# Patient Record
(demographics unavailable — no encounter records)

---

## 2024-10-14 NOTE — HISTORY OF PRESENT ILLNESS
[FreeTextEntry1] : 10/25/23 Pt is s/p right knee RFA performed on 9/27/23 and a left knee RFA on 10/4/23 patient experienced 50% relief up until this Monday 10/23/23 due to her suffering a fall. Pt states that she didn't lift her feet high enough to get into her house causing her to fall. Pain now is dull/achy in nature. She will occasionally experience radiating pain down the b/l legs. Pt has been using Motrin and meloxicam 15mg PRN. She noticed that her knee pain is made worse after physical activity and rainy weather. Pt is feeling ok today rating her pain a 5/10 on the pain scale.   PRESENTING TODAY 9/30/24: Patient presents for a follow up visit today; she is most recently status post a Lumbar interlaminar (L4-5) epidural steroid injection with MAC on 09/11/2024, with 90% relief to date, she is able to carry on her daily living with less pain.  Overall she states that her pain is stable and she has no more radiating pain down her lower extremity. Patient denies any bowel or bladder dysfunction, incontinence, or saddle anesthesia.  10-: Revisit encounter.   She is presenting today with a flare up of lower back and radicular pain. She has been experiencing pain in the right side of the lower back which is made worse with walking for prolonged periods of time. She describes the pain as a sharp sensation. Her main pain complaint is her axial pain. She cannot walk as she starts to get the sharp sensations in her back which causes her to sit down. She has no pain when sitting at rest. She does also note right leg pain which gets centered in different area (calf, hamstring, thigh).

## 2024-10-14 NOTE — DISCUSSION/SUMMARY
[de-identified] : A discussion regarding available pain management treatment options occurred with the patient. These included interventional, rehabilitative, pharmacological, and alternative modalities. We will proceed with the following:   Interventional/ Procedures: 1. Proceed with a Right L3, L4, L5 medial branch facet block under fluoroscopic guidance and sedation.  Treatment options were discussed with the patient. The patient has been having persistent axial low back pain that has minimally improved with conservative therapy.    The patient was given the option to proceed with a lumbar medial branch block, which would be potentially both diagnostic and therapeutic. If the patient has a positive response to the local anesthetic, With two positive responses we can proceed with a radiofrequency ablation of the lumbar medial branch nerves for potential long term pain relief. If the patient does not get relief we can consider other options.    The risks and benefits were discussed which included bleeding, infection, nerve injury, no pain relief or worse, increased pain, intrathecal injection, and the side effects of steroids.  All questions were answered to the patient's satisfaction  The patient has severe anxiety of procedures that necessitates monitored anesthesia care (MAC). The procedure performed will be close to major nerves, arteries, and spinal cord and/or joint structures. Due to the proximity of these structures, we need the patient to be still during the procedure.  With the help of MAC, this will be safely achieved and decrease the risk of any complications.   - Follow up 2 weeks post injection.   I, Margot Torres, attest that this documentation has been prepared under the direction and in the presence of Provider Nicolas Brown, DO   The documentation recorded by the scribe, in my presence, accurately reflects the service I personally performed, and the decisions made by me with my edits as appropriate.   Best Regards,  Nicolas Brown D.O.

## 2024-10-30 NOTE — PROCEDURE
[FreeTextEntry3] : Date of Service: 10/30/2024   Account: [id number]  Patient: FRANCIS GARCIA   YOB: 1948  Age: 76 year  Surgeon:                  Nicolas Brown DO  Assistant:                None  Pre-Operative Diagnosis: Spondylosis of Lumbar Region without Myelopathy or Radiculopathy (M47.816)      Post Operative Diagnosis: Spondylosis of Lumbar Region without Myelopathy or Radiculopathy (M47.816)      Procedure:             Right L3-5 medial branch block under fluoroscopic guidance.  Anesthesia:            MAC  This procedure was carried out using fluoroscopic guidance.  The risks and benefits of the procedure were discussed extensively with the patient.  The consent of the patient was obtained and the following procedure was performed. The patient was placed in the prone position on the fluoroscopy table and the area was prepped and draped in a sterile fashion.  A timeout was performed with all essential staff present and the site and side were verified.  The patient was placed in the prone position with a pillow under the abdomen to minimize the lumbar lordosis.  The lumbar area was prepped and draped in a sterile fashion.  The lumbar vertebral bodies were identified and the fluoroscope was obliqued ipsilateral to approximately 30 degrees to reveal the appropriate anatomical view.  The junction of the superior articulate process and transverse process at the right  L4, L5, and sacral ala levels were identified and marked.   A spinal needle was then introduced and advanced to the above target points at the junction of the SAP and transverse processes until bone was contacted.     After negative aspiration for heme and CSF, an injectate of 1 cc 0.25% Marcaine mixed with 10mg dexamethasone was injected at each of the injection sites.  The needles were subsequently removed.  Vital signs remained normal.  Pulse oximeter was used throughout the procedure and the patient's pulse and oxygen saturation remained within normal limits.  The patient tolerated the procedure well.  There were no complications.  The patient was instructed to apply ice over the injection sites for twenty minutes every two hours for the next 24 to 48 hours.  Disposition:       1. The patient was advised to F/U in 1-2 weeks to assess the response to the injection.       2. They were advised to keep a pain diary to report the results of the diagnostic block at their FUV.      3. The patient was also instructed to contact me immediately if there were any concerns related to the procedure performed.   Nicolas Brown DO

## 2024-11-11 NOTE — DISCUSSION/SUMMARY
[de-identified] : A discussion regarding available pain management treatment options occurred with the patient. These included interventional, rehabilitative, pharmacological, and alternative modalities. We will proceed with the following:   Interventional/ Procedures: 1. Proceed with a right L3, L4, L5 medial branch facet block under fluoroscopic guidance and sedation.  Treatment options were discussed with the patient. The patient has been having persistent axial low back pain that has minimally improved with conservative therapy.  The patient was given the option to proceed with a lumbar medial branch block, which would be potentially both diagnostic and therapeutic. If the patient has a positive response to the local anesthetic, with two positive responses we can proceed with a radiofrequency ablation of the lumbar medial branch nerves for potential long term pain relief. If the patient does not get relief, we can consider other options.    The risks and benefits were discussed which included bleeding, infection, nerve injury, no pain relief or worse, increased pain, intrathecal injection, and the side effects of steroids.  All questions were answered to the patient's satisfaction  The patient has severe anxiety of procedures that necessitates monitored anesthesia care (MAC). The procedure performed will be close to major nerves, arteries, and spinal cord and/or joint structures. Due to the proximity of these structures, we need the patient to be still during the procedure.  With the help of MAC, this will be safely achieved and decrease the risk of any complications.   2.  Opted to perform a left knee steroid injection in office today. Risks with the procedure such as bleeding and infection was discussed in detail.  - Follow up 2 weeks post injection.   I, Jose Kruger, attest that this documentation has been prepared under the direction and in the presence of Provider Nicolas Brown, DO The documentation recorded by the scribe, in my presence, accurately reflects the service I personally performed, and the decisions made by me with my edits as appropriate.   Best Regards, Nicolas Brown D.O.

## 2024-11-11 NOTE — PROCEDURE
[Large Joint Injection] : Large joint injection [Left] : of the left [Knee] : knee [Pain] : pain [Alcohol] : alcohol [Sterile technique used] : sterile technique used [___ cc    0.5%] : Bupivacaine (Marcaine) ~Vcc of 0.5%  [___ cc    4mg] : Dexamethasone (Decadron) ~Vcc of 4 mg  [] : Patient tolerated procedure well [Call if redness, pain or fever occur] : call if redness, pain or fever occur [Apply ice for 15min out of every hour for the next 12-24 hours as tolerated] : apply ice for 15 minutes out of every hour for the next 12-24 hours as tolerated [Previous OTC use and PT nontherapeutic] : patient has tried OTC's including aspirin, Ibuprofen, Aleve, etc or prescription NSAIDS, and/or exercises at home and/or physical therapy without satisfactory response [Patient had decreased mobility in the joint] : patient had decreased mobility in the joint [Risks, benefits, alternatives discussed / Verbal consent obtained] : the risks benefits, and alternatives have been discussed, and verbal consent was obtained

## 2024-11-11 NOTE — HISTORY OF PRESENT ILLNESS
[FreeTextEntry1] : 10/25/23 Pt is s/p right knee RFA performed on 9/27/23 and a left knee RFA on 10/4/23 patient experienced 50% relief up until this Monday 10/23/23 due to her suffering a fall. Pt states that she didn't lift her feet high enough to get into her house causing her to fall. Pain now is dull/achy in nature. She will occasionally experience radiating pain down the b/l legs. Pt has been using Motrin and meloxicam 15mg PRN. She noticed that her knee pain is made worse after physical activity and rainy weather. Pt is feeling ok today rating her pain a 5/10 on the pain scale.   PRESENTING TODAY 9/30/24: Patient presents for a follow up visit today; she is most recently status post a Lumbar interlaminar (L4-5) epidural steroid injection with MAC on 09/11/2024, with 90% relief to date, she is able to carry on her daily living with less pain.  Overall she states that her pain is stable and she has no more radiating pain down her lower extremity. Patient denies any bowel or bladder dysfunction, incontinence, or saddle anesthesia.  PRESENTING TODAY 11-: Last seen on 10/14/2024 and since then she is status post a right L3-5 medial branch block under fluoroscopic guidance on 10/30/24 which provided her with 80% relief for 3 to 4 days.  She had less intense and less frequent pain.  However, pain is returned to baseline. She has been experiencing pain in the right side of the lower back which is made worse with walking for prolonged periods of time. She describes the pain as a sharp sensation. Her main pain complaint is her axial pain. She cannot walk as she starts to get the sharp sensations in her back which causes her to sit down. She has no pain when sitting at rest. She does also note right leg pain which gets centered in different area (calf, hamstring, thigh).   Her chief complaint today is her left knee pain that is dull, achy knee pain worse with standing and walking, better with sitting without associated numbness, tingling or radicular pain for (6 weeks). Pain is 8/10 in intensity. She has tried NSAIDs, PT/HEP without significant relief. Patient denies locking, clicking, instability, falls.

## 2024-11-27 NOTE — PROCEDURE
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  Anesthesia:            MAC  This procedure was carried out using fluoroscopic guidance.  The risks and benefits of the procedure were discussed extensively with the patient.  The consent of the patient was obtained and the following procedure was performed. The patient was placed in the prone position on the fluoroscopy table and the area was prepped and draped in a sterile fashion.  A timeout was performed with all essential staff present and the site and side were verified.  The patient was placed in the prone position with a pillow under the abdomen to minimize the lumbar lordosis.  The lumbar area was prepped and draped in a sterile fashion.  The lumbar vertebral bodies were identified and the fluoroscope was obliqued ipsilateral to approximately 30 degrees to reveal the appropriate anatomical view.  The junction of the superior articulate process and transverse process at the right  L4, L5, and sacral ala levels were identified and marked.   A spinal needle was then introduced and advanced to the above target points at the junction of the SAP and transverse processes until bone was contacted.     After negative aspiration for heme and CSF, an injectate of 1 cc 0.25% Marcaine mixed with 10mg dexamethasone was injected at each of the injection sites.  The needles were subsequently removed.  Vital signs remained normal.  Pulse oximeter was used throughout the procedure and the patient's pulse and oxygen saturation remained within normal limits.  The patient tolerated the procedure well.  There were no complications.  The patient was instructed to apply ice over the injection sites for twenty minutes every two hours for the next 24 to 48 hours.  Disposition:       1. The patient was advised to F/U in 1-2 weeks to assess the response to the injection.       2. They were advised to keep a pain diary to report the results of the diagnostic block at their FUV.      3. The patient was also instructed to contact me immediately if there were any concerns related to the procedure performed.   Nicolas Brown DO

## 2024-12-09 NOTE — DISCUSSION/SUMMARY
[de-identified] : A discussion regarding available pain management treatment options occurred with the patient. These included interventional, rehabilitative, pharmacological, and alternative modalities. We will proceed with the following:   Interventional/ Procedures: 1. Proceed with a right L3, L4, L5 medial branch RFA under fluoroscopic guidance and sedation. The patient had a positive response to two lumbar medial branch block blocks so the decision was made to proceed with a lumbar medial branch radiofrequency ablation to try to get some longer term relief. Given that the patient had very good pain relief for the duration of the local anesthetic during the medial branch block, our hopes are that with the radiofrequency procedure we will be able to get 6-9 months of good lumbar spine pain relief.       The risks of the procedure were discussed which included the small but potential risks of bleeding, nerve damage, worsening pain, and the need to repeat the procedure to name a few. The patient accepts these risks and would like to proceed.  The patient has severe anxiety of procedures that necessitates monitored anesthesia care (MAC). The procedure performed will be close to major nerves, arteries, and spinal cord and/or joint structures. Due to the proximity of these structures, we need the patient to be still during the procedure.  With the help of MAC, this will be safely achieved and decrease the risk of any complications.   - Follow up 2 weeks post injection.   I, Jose Kruger, attest that this documentation has been prepared under the direction and in the presence of Provider Nicolas Brown, DO The documentation recorded by the scribe, in my presence, accurately reflects the service I personally performed, and the decisions made by me with my edits as appropriate.   Best Regards, Nicolas Brown D.O.

## 2024-12-09 NOTE — HISTORY OF PRESENT ILLNESS
[FreeTextEntry1] : 10/25/23 Pt is s/p right knee RFA performed on 9/27/23 and a left knee RFA on 10/4/23 patient experienced 50% relief up until this Monday 10/23/23 due to her suffering a fall. Pt states that she didn't lift her feet high enough to get into her house causing her to fall. Pain now is dull/achy in nature. She will occasionally experience radiating pain down the b/l legs. Pt has been using Motrin and meloxicam 15mg PRN. She noticed that her knee pain is made worse after physical activity and rainy weather. Pt is feeling ok today rating her pain a 5/10 on the pain scale.   PRESENTING TODAY 9/30/24: Patient presents for a follow up visit today; she is most recently status post a Lumbar interlaminar (L4-5) epidural steroid injection with MAC on 09/11/2024, with 90% relief to date, she is able to carry on her daily living with less pain. Overall, she states that her pain is stable, and she has no more radiating pain down her lower extremity. Patient denies any bowel or bladder dysfunction, incontinence, or saddle anesthesia.  PRESENTING TODAY 12-: Last seen on 11/11/2024 and since then she is status post a 2nd right L3-5 medial branch block under fluoroscopic guidance on 11/27/24 which provided her with 80% sustained relief to date.  She had less intense and less frequent pain. She has been experiencing pain in the right side of the lower back which is made worse with walking for prolonged periods of time. She describes the pain as a sharp sensation. Her main pain complaint is her axial pain. She cannot walk as she starts to get the sharp sensations in her back which causes her to sit down. She has no pain when sitting at rest. She does also note right leg pain which gets centered in different area (calf, hamstring, thigh).  Pain is a 3/10 on the pain scale.  She is status post a left knee steroid injection since the last visit which provided her with 80% sustained relief to date. She notes less intense and less frequent pain. Pain is 2-3/10 in intensity.  Patient denies locking, clicking, instability, falls.

## 2025-01-29 NOTE — PROCEDURE
[FreeTextEntry3] : Date of Service: 01/29/2025   Account: [id number]  Patient: FRANCIS GARCIA   YOB: 1948  Age: 76 year  Surgeon:                  Nicolas Brown DO  Assistant:                None  Pre-Operative Diagnosis:   Spondylosis of Lumbar Region without Myelopathy or Radiculopathy (M47.816)      Post Operative Diagnosis: Spondylosis of Lumbar Region without Myelopathy or Radiculopathy (M47.816)      Procedure:             Right  L4-5, L5-S1 facet joint radiofrequency ablation with fluoroscopic guidance.  Anesthesia:            MAC  This procedure was carried out using fluoroscopic guidance.  The risks and benefits of the procedure were discussed extensively with the patient.  The consent of the patient was obtained and the following procedure was performed. The patient was placed in the prone position on the fluoroscopy table and the area was prepped and draped in a sterile fashion.  A timeout was performed with all essential staff present and the site and side were verified.  The fluoroscope was then redirected under A/P view to visualize the right  L4-L5 and L5-S1 facet joint levels. The camera was obliqued to approximately 10-15 degrees. The junction of the superior articulate process and transverse process at the targeted levels were then identified and marked. Skin and subcutaneous structures were then anesthetized with approximately 3 mL of 1% Lidocaine at each of these levels.  After this, an 18-gauge 10cm radiofrequency needle with a 10mm curved active tip then advanced until the junction at the SAP and transverse process was met at right L4, L5, and sacral ala.  Both ipsilateral oblique views were obtained in order to advance the needle to the intended targets which was at the junction of the SAP and transverse process.   At all the treated levels,  motor stimulation testing at 2 Hz not exceeding 3.0 volts.  There was no abnormal motor stimulation observed or reported by the patient most notably in the lower extremities.  Each treated level was then anesthetized with approximately 1 ml of 2% lidocaine mixed with 10mg dexamethasone. After which each area was then ablated at 80 degrees centigrade for 90 seconds each. Patient felt no pain reproduction during the ablation procedure.  The needles were subsequently removed.  Vital signs remained normal.  Pulse oximeter was used throughout the procedure and the patient's pulse and oxygen saturation remained within normal limits.  The patient tolerated the procedure well.  There were no complications.  The patient was instructed to apply ice over the injection sites for twenty minutes every two hours for the next 24 to 48 hours.  Disposition:       1. The patient was advised to F/U in 4 weeks to assess the response to the procedure.       2. The patient was also instructed to contact me immediately if there were any concerns related to the procedure performed.

## 2025-02-17 NOTE — HISTORY OF PRESENT ILLNESS
[FreeTextEntry1] : 10/25/23 Pt is s/p right knee RFA performed on 9/27/23 and a left knee RFA on 10/4/23 patient experienced 50% relief up until this Monday 10/23/23 due to her suffering a fall. Pt states that she didn't lift her feet high enough to get into her house causing her to fall. Pain now is dull/achy in nature. She will occasionally experience radiating pain down the b/l legs. Pt has been using Motrin and meloxicam 15mg PRN. She noticed that her knee pain is made worse after physical activity and rainy weather. Pt is feeling ok today rating her pain a 5/10 on the pain scale.   PRESENTING TODAY 9/30/24: Patient presents for a follow up visit today; she is most recently status post a Lumbar interlaminar (L4-5) epidural steroid injection with MAC on 09/11/2024, with 90% relief to date, she is able to carry on her daily living with less pain. Overall, she states that her pain is stable, and she has no more radiating pain down her lower extremity. Patient denies any bowel or bladder dysfunction, incontinence, or saddle anesthesia.  PRESENTING TODAY 02-: Last seen on 12/09/2024 and since then she is status post a Right L4-5, L5-S1 facet joint radiofrequency ablation with fluoroscopic guidance on 01/29/25 which provided her with 70% sustained relief to date so far.  She had decrease in pain, increase in function and better functionality overall.  She continues with intermittent numbness and tingling into her right lower extremity.  Today her chief complaint is bilateral left worse than right knee pain that is dull, achy knee pain worse with standing and walking, better with sitting without associated numbness, tingling or radicular pain for (6 weeks). Pain is 8/10 in intensity. He has tried NSAIDs, PT/HEP without significant relief. Patient admits to locking, clicking, instability, falls.

## 2025-02-17 NOTE — DISCUSSION/SUMMARY
[de-identified] : A discussion regarding available pain management treatment options occurred with the patient. These included interventional, rehabilitative, pharmacological, and alternative modalities. We will proceed with the following:   Interventional/ Procedures: 1. Opted to perform a left knee steroid injection in office today. Risks with the procedure such as bleeding and infection was discussed in detail.  Medications: - ordered gabapentin 800 mg 3 times daily for duration of 4 weeks.  Follow up in 4-6 weeks for reassessment.  I, Jose Kruger, attest that this documentation has been prepared under the direction and in the presence of Provider Nicolas Brown, DO The documentation recorded by the scribe, in my presence, accurately reflects the service I personally performed, and the decisions made by me with my edits as appropriate.   Best Regards, Nicolas Brown D.O.

## 2025-02-17 NOTE — PROCEDURE
[Large Joint Injection] : Large joint injection [Left] : of the left [Knee] : knee [Pain] : pain [Alcohol] : alcohol [___ cc    0.5%] : Bupivacaine (Marcaine) ~Vcc of 0.5%  [___ cc    4mg] : Dexamethasone (Decadron) ~Vcc of 4 mg  [Call if redness, pain or fever occur] : call if redness, pain or fever occur [Apply ice for 15min out of every hour for the next 12-24 hours as tolerated] : apply ice for 15 minutes out of every hour for the next 12-24 hours as tolerated [Previous OTC use and PT nontherapeutic] : patient has tried OTC's including aspirin, Ibuprofen, Aleve, etc or prescription NSAIDS, and/or exercises at home and/or physical therapy without satisfactory response [Patient had decreased mobility in the joint] : patient had decreased mobility in the joint [Risks, benefits, alternatives discussed / Verbal consent obtained] : the risks benefits, and alternatives have been discussed, and verbal consent was obtained

## 2025-03-17 NOTE — DISCUSSION/SUMMARY
[de-identified] : A discussion regarding available pain management treatment options occurred with the patient. These included interventional, rehabilitative, pharmacological, and alternative modalities. We will proceed with the following:   Interventional/ Procedures: 1. Opted to perform a left knee steroid injection in office today. Risks with the procedure such as bleeding and infection was discussed in detail.  Medications: - ordered gabapentin 800 mg 3 times daily for duration of 4 weeks.  Follow up in 4-6 weeks for reassessment.  I, Jose Kruger, attest that this documentation has been prepared under the direction and in the presence of Provider Nicolas Brown, DO The documentation recorded by the scribe, in my presence, accurately reflects the service I personally performed, and the decisions made by me with my edits as appropriate.   Best Regards, Nicolas Brown D.O.

## 2025-03-17 NOTE — DISCUSSION/SUMMARY
[de-identified] : A discussion regarding available pain management treatment options occurred with the patient. These included interventional, rehabilitative, pharmacological, and alternative modalities. We will proceed with the following:   Interventional/ Procedures: 1. Opted to perform a left knee steroid injection in office today. Risks with the procedure such as bleeding and infection was discussed in detail.  Medications: - ordered gabapentin 800 mg 3 times daily for duration of 4 weeks.  Follow up in 4-6 weeks for reassessment.  I, Jose Kruger, attest that this documentation has been prepared under the direction and in the presence of Provider Nicolas Brown, DO The documentation recorded by the scribe, in my presence, accurately reflects the service I personally performed, and the decisions made by me with my edits as appropriate.   Best Regards, Nicolas Brown D.O.

## 2025-03-17 NOTE — PROCEDURE
[Left] : of the left [___ cc    4mg] : Dexamethasone (Decadron) ~Vcc of 4 mg  [Large Joint Injection] : Large joint injection [Bilateral] : bilaterally of the [Knee] : knee [Pain] : pain [Alcohol] : alcohol [___ cc    0.5%] : Bupivacaine (Marcaine) ~Vcc of 0.5%  [Synvisc (16mg)] : 16mg of Synvisc [#1] : series #1 [Call if redness, pain or fever occur] : call if redness, pain or fever occur [Apply ice for 15min out of every hour for the next 12-24 hours as tolerated] : apply ice for 15 minutes out of every hour for the next 12-24 hours as tolerated [Previous OTC use and PT nontherapeutic] : patient has tried OTC's including aspirin, Ibuprofen, Aleve, etc or prescription NSAIDS, and/or exercises at home and/or physical therapy without satisfactory response [Patient had decreased mobility in the joint] : patient had decreased mobility in the joint [Risks, benefits, alternatives discussed / Verbal consent obtained] : the risks benefits, and alternatives have been discussed, and verbal consent was obtained

## 2025-03-24 NOTE — PROCEDURE
[Alcohol] : alcohol [___ cc    0.25%] : Bupivacaine (Marcaine) ~Vcc of 0.25%  [Synvisc (16mg)] : 16mg of Synvisc [#2] : series #2 [Call if redness, pain or fever occur] : call if redness, pain or fever occur [Apply ice for 15min out of every hour for the next 12-24 hours as tolerated] : apply ice for 15 minutes out of every hour for the next 12-24 hours as tolerated [Previous OTC use and PT nontherapeutic] : patient has tried OTC's including aspirin, Ibuprofen, Aleve, etc or prescription NSAIDS, and/or exercises at home and/or physical therapy without satisfactory response [Patient had decreased mobility in the joint] : patient had decreased mobility in the joint [Risks, benefits, alternatives discussed / Verbal consent obtained] : the risks benefits, and alternatives have been discussed, and verbal consent was obtained

## 2025-03-31 NOTE — PROCEDURE
[Large Joint Injection] : Large joint injection [Bilateral] : bilaterally of the [Knee] : knee [Pain] : pain [Alcohol] : alcohol [___ cc    0.5%] : Bupivacaine (Marcaine) ~Vcc of 0.5%  [____] : [unfilled] [Synvisc (16mg)] : 16mg of Synvisc [#3] : series #3 [] : Patient tolerated procedure well [Call if redness, pain or fever occur] : call if redness, pain or fever occur [Apply ice for 15min out of every hour for the next 12-24 hours as tolerated] : apply ice for 15 minutes out of every hour for the next 12-24 hours as tolerated [Previous OTC use and PT nontherapeutic] : patient has tried OTC's including aspirin, Ibuprofen, Aleve, etc or prescription NSAIDS, and/or exercises at home and/or physical therapy without satisfactory response [Patient had decreased mobility in the joint] : patient had decreased mobility in the joint [Risks, benefits, alternatives discussed / Verbal consent obtained] : the risks benefits, and alternatives have been discussed, and verbal consent was obtained